# Patient Record
Sex: FEMALE | Race: AMERICAN INDIAN OR ALASKA NATIVE
[De-identification: names, ages, dates, MRNs, and addresses within clinical notes are randomized per-mention and may not be internally consistent; named-entity substitution may affect disease eponyms.]

---

## 2017-02-05 ENCOUNTER — HOSPITAL ENCOUNTER (EMERGENCY)
Dept: HOSPITAL 5 - ED | Age: 27
Discharge: LEFT BEFORE DISCHARGE | End: 2017-02-05
Payer: MEDICAID

## 2017-02-05 VITALS — SYSTOLIC BLOOD PRESSURE: 122 MMHG | DIASTOLIC BLOOD PRESSURE: 74 MMHG

## 2017-02-05 DIAGNOSIS — N93.9: Primary | ICD-10-CM

## 2017-02-05 DIAGNOSIS — Z53.21: ICD-10-CM

## 2017-02-05 LAB
BASOPHILS NFR BLD AUTO: 0.6 % (ref 0–1.8)
EOSINOPHIL NFR BLD AUTO: 2.7 % (ref 0–4.3)
HCT VFR BLD CALC: 36.8 % (ref 30.3–42.9)
HGB BLD-MCNC: 11.9 GM/DL (ref 10.1–14.3)
MCH RBC QN AUTO: 21 PG (ref 28–32)
MCHC RBC AUTO-ENTMCNC: 33 % (ref 30–34)
MCV RBC AUTO: 65 FL (ref 79–97)
PLATELET # BLD: 212 K/MM3 (ref 140–440)
RBC # BLD AUTO: 5.7 M/MM3 (ref 3.65–5.03)
WBC # BLD AUTO: 7.9 K/MM3 (ref 4.5–11)

## 2017-02-05 PROCEDURE — 84702 CHORIONIC GONADOTROPIN TEST: CPT

## 2017-02-05 PROCEDURE — 85025 COMPLETE CBC W/AUTO DIFF WBC: CPT

## 2017-02-05 PROCEDURE — 86901 BLOOD TYPING SEROLOGIC RH(D): CPT

## 2017-02-05 PROCEDURE — 86850 RBC ANTIBODY SCREEN: CPT

## 2017-02-05 PROCEDURE — 36415 COLL VENOUS BLD VENIPUNCTURE: CPT

## 2017-02-05 PROCEDURE — 86900 BLOOD TYPING SEROLOGIC ABO: CPT

## 2017-02-09 NOTE — ED ELOPEMENT REVIEW
ED Pt Elopement review





- Results review


Lab results: 





 Laboratory Tests











  02/05/17 02/05/17 02/05/17





  11:46 11:46 11:46


 


WBC  7.9  


 


RBC  5.70 H  


 


Hgb  11.9  


 


Hct  36.8  


 


MCV  65 L  


 


MCH  21 L  


 


MCHC  33  


 


RDW  14.5  


 


Plt Count  212  


 


Lymph % (Auto)  24.8  


 


Mono % (Auto)  5.0  


 


Eos % (Auto)  2.7  


 


Baso % (Auto)  0.6  


 


Lymph #  2.0  


 


Mono #  0.4  


 


Eos #  0.2  


 


Baso #  0.0  


 


Seg Neutrophils %  66.9  


 


Seg Neutrophils #  5.3  


 


HCG, Quant   83761 H 


 


Blood Type    O POSITIVE


 


Antibody Screen    Negative














- Call Back decision


Pt Call Back Decision: Call pt to return to ED ASAP (pregnant and vaginal 

bleeding and no prenatal care bring back to rule out ectopic)

## 2017-02-22 ENCOUNTER — HOSPITAL ENCOUNTER (EMERGENCY)
Dept: HOSPITAL 5 - ED | Age: 27
Discharge: LEFT BEFORE DISCHARGE | End: 2017-02-22
Payer: MEDICAID

## 2017-02-22 VITALS — SYSTOLIC BLOOD PRESSURE: 132 MMHG | DIASTOLIC BLOOD PRESSURE: 71 MMHG

## 2017-02-22 DIAGNOSIS — N93.9: Primary | ICD-10-CM

## 2017-02-22 DIAGNOSIS — R10.9: ICD-10-CM

## 2017-02-22 DIAGNOSIS — Z53.21: ICD-10-CM

## 2017-02-22 LAB
BASOPHILS NFR BLD AUTO: 0.3 % (ref 0–1.8)
BILIRUB UR QL STRIP: (no result)
BLOOD UR QL VISUAL: (no result)
EOSINOPHIL NFR BLD AUTO: 1.8 % (ref 0–4.3)
HCT VFR BLD CALC: 37.3 % (ref 30.3–42.9)
HGB BLD-MCNC: 12.4 GM/DL (ref 10.1–14.3)
KETONES UR STRIP-MCNC: 80 MG/DL
LEUKOCYTE ESTERASE UR QL STRIP: (no result)
MCH RBC QN AUTO: 21 PG (ref 28–32)
MCHC RBC AUTO-ENTMCNC: 33 % (ref 30–34)
MCV RBC AUTO: 64 FL (ref 79–97)
MUCOUS THREADS #/AREA URNS HPF: (no result) /HPF
NITRITE UR QL STRIP: (no result)
PH UR STRIP: 6 [PH] (ref 5–7)
PLATELET # BLD: 201 K/MM3 (ref 140–440)
RBC # BLD AUTO: 5.86 M/MM3 (ref 3.65–5.03)
RBC #/AREA URNS HPF: 16 /HPF (ref 0–6)
UROBILINOGEN UR-MCNC: < 2 MG/DL (ref ?–2)
WBC # BLD AUTO: 10.2 K/MM3 (ref 4.5–11)
WBC #/AREA URNS HPF: 166 /HPF (ref 0–6)

## 2017-02-22 PROCEDURE — 86900 BLOOD TYPING SEROLOGIC ABO: CPT

## 2017-02-22 PROCEDURE — 36415 COLL VENOUS BLD VENIPUNCTURE: CPT

## 2017-02-22 PROCEDURE — 86901 BLOOD TYPING SEROLOGIC RH(D): CPT

## 2017-02-22 PROCEDURE — 81025 URINE PREGNANCY TEST: CPT

## 2017-02-22 PROCEDURE — 81001 URINALYSIS AUTO W/SCOPE: CPT

## 2017-02-22 PROCEDURE — 85025 COMPLETE CBC W/AUTO DIFF WBC: CPT

## 2017-02-22 PROCEDURE — 86850 RBC ANTIBODY SCREEN: CPT

## 2017-02-22 NOTE — ED ELOPEMENT REVIEW
ED Pt Elopement review





- Results review


Lab results: 





 Laboratory Tests











  02/22/17 02/22/17 02/22/17





  00:50 00:56 Unknown


 


WBC   10.2 


 


RBC   5.86 H 


 


Hgb   12.4 


 


Hct   37.3 


 


MCV   64 L 


 


MCH   21 L 


 


MCHC   33 


 


RDW   14.6 


 


Plt Count   201 


 


Lymph % (Auto)   24.6 


 


Mono % (Auto)   5.6 


 


Eos % (Auto)   1.8 


 


Baso % (Auto)   0.3 


 


Lymph #   2.5 


 


Mono #   0.6 


 


Eos #   0.2 


 


Baso #   0.0 


 


Seg Neutrophils %   67.7 


 


Seg Neutrophils #   6.9 


 


Urine Color    Yellow


 


Urine Turbidity    Cloudy


 


Urine pH    6.0


 


Ur Specific Gravity    1.026


 


Urine Protein    30 mg/dl


 


Urine Glucose (UA)    Neg


 


Urine Ketones    80


 


Urine Blood    Mod


 


Urine Nitrite    Neg


 


Ur Reducing Substances    Not Reportable


 


Urine Bilirubin    Neg


 


Urine Ictotest    Not Reportable


 


Urine Urobilinogen    < 2.0


 


Ur Leukocyte Esterase    Lg


 


Urine WBC (Auto)    166.0 H


 


Urine RBC (Auto)    16.0


 


U Epithel Cells (Auto)    15.0 H


 


Hyaline Casts    3


 


Urine Mucus    2+


 


Urine HCG, Qual    Positive A


 


Blood Type  O POSITIVE  


 


Antibody Screen  Negative  














- Call Back decision


Pt Call Back Decision: No action required

## 2017-03-19 ENCOUNTER — HOSPITAL ENCOUNTER (EMERGENCY)
Dept: HOSPITAL 5 - ED | Age: 27
Discharge: LEFT BEFORE DISCHARGE | End: 2017-03-19
Payer: MEDICAID

## 2017-03-19 VITALS — SYSTOLIC BLOOD PRESSURE: 134 MMHG | DIASTOLIC BLOOD PRESSURE: 84 MMHG

## 2017-03-19 DIAGNOSIS — Z53.21: ICD-10-CM

## 2017-03-19 DIAGNOSIS — R10.9: Primary | ICD-10-CM

## 2017-03-19 LAB
ALBUMIN SERPL-MCNC: 3.9 G/DL (ref 3.9–5)
ALBUMIN/GLOB SERPL: 1 %
ALP SERPL-CCNC: 43 UNITS/L (ref 35–129)
ALT SERPL-CCNC: 12 UNITS/L (ref 7–56)
ANION GAP SERPL CALC-SCNC: 17 MMOL/L
BASOPHILS NFR BLD AUTO: 0.3 % (ref 0–1.8)
BILIRUB SERPL-MCNC: 0.4 MG/DL (ref 0.1–1.2)
BUN SERPL-MCNC: 7 MG/DL (ref 7–17)
BUN/CREAT SERPL: 14 %
CALCIUM SERPL-MCNC: 9 MG/DL (ref 8.4–10.2)
CHLORIDE SERPL-SCNC: 99.5 MMOL/L (ref 98–107)
CO2 SERPL-SCNC: 21 MMOL/L (ref 22–30)
EOSINOPHIL NFR BLD AUTO: 0.2 % (ref 0–4.3)
GLUCOSE SERPL-MCNC: 95 MG/DL (ref 65–100)
HCT VFR BLD CALC: 34.3 % (ref 30.3–42.9)
HGB BLD-MCNC: 11.2 GM/DL (ref 10.1–14.3)
LIPASE SERPL-CCNC: 26 UNITS/L (ref 13–60)
MCH RBC QN AUTO: 21 PG (ref 28–32)
MCHC RBC AUTO-ENTMCNC: 33 % (ref 30–34)
MCV RBC AUTO: 65 FL (ref 79–97)
PLATELET # BLD: 200 K/MM3 (ref 140–440)
POTASSIUM SERPL-SCNC: 3.7 MMOL/L (ref 3.6–5)
PROT SERPL-MCNC: 7.9 G/DL (ref 6.3–8.2)
RBC # BLD AUTO: 5.31 M/MM3 (ref 3.65–5.03)
SODIUM SERPL-SCNC: 134 MMOL/L (ref 137–145)
WBC # BLD AUTO: 14.6 K/MM3 (ref 4.5–11)

## 2017-03-19 PROCEDURE — 80053 COMPREHEN METABOLIC PANEL: CPT

## 2017-03-19 PROCEDURE — 36415 COLL VENOUS BLD VENIPUNCTURE: CPT

## 2017-03-19 PROCEDURE — 83690 ASSAY OF LIPASE: CPT

## 2017-03-19 PROCEDURE — 85025 COMPLETE CBC W/AUTO DIFF WBC: CPT

## 2021-02-13 ENCOUNTER — HOSPITAL ENCOUNTER (EMERGENCY)
Dept: HOSPITAL 5 - ED | Age: 31
Discharge: HOME | End: 2021-02-13
Payer: SELF-PAY

## 2021-02-13 VITALS — DIASTOLIC BLOOD PRESSURE: 87 MMHG | SYSTOLIC BLOOD PRESSURE: 150 MMHG

## 2021-02-13 DIAGNOSIS — N76.0: ICD-10-CM

## 2021-02-13 DIAGNOSIS — Z98.890: ICD-10-CM

## 2021-02-13 DIAGNOSIS — R10.2: ICD-10-CM

## 2021-02-13 DIAGNOSIS — N93.9: Primary | ICD-10-CM

## 2021-02-13 DIAGNOSIS — F17.200: ICD-10-CM

## 2021-02-13 DIAGNOSIS — Z79.899: ICD-10-CM

## 2021-02-13 DIAGNOSIS — Z86.018: ICD-10-CM

## 2021-02-13 LAB
BACTERIA #/AREA URNS HPF: (no result) /HPF
BILIRUB UR QL STRIP: (no result)
BLOOD UR QL VISUAL: (no result)
BUN SERPL-MCNC: 9 MG/DL (ref 7–17)
BUN/CREAT SERPL: 15 %
CALCIUM SERPL-MCNC: 8.4 MG/DL (ref 8.4–10.2)
HCT VFR BLD CALC: 37.1 % (ref 30.3–42.9)
HEMOLYSIS INDEX: 4
HGB BLD-MCNC: 12.2 GM/DL (ref 10.1–14.3)
MCHC RBC AUTO-ENTMCNC: 33 % (ref 30–34)
MCV RBC AUTO: 66 FL (ref 79–97)
MUCOUS THREADS #/AREA URNS HPF: (no result) /HPF
PH UR STRIP: 5 [PH] (ref 5–7)
PLATELET # BLD: 180 K/MM3 (ref 140–440)
PROT UR STRIP-MCNC: (no result) MG/DL
RBC # BLD AUTO: 5.63 M/MM3 (ref 3.65–5.03)
RBC #/AREA URNS HPF: 2 /HPF (ref 0–6)
UROBILINOGEN UR-MCNC: 2 MG/DL (ref ?–2)
WBC #/AREA URNS HPF: 1 /HPF (ref 0–6)

## 2021-02-13 PROCEDURE — 99283 EMERGENCY DEPT VISIT LOW MDM: CPT

## 2021-02-13 PROCEDURE — 81001 URINALYSIS AUTO W/SCOPE: CPT

## 2021-02-13 PROCEDURE — 84702 CHORIONIC GONADOTROPIN TEST: CPT

## 2021-02-13 PROCEDURE — 87591 N.GONORRHOEAE DNA AMP PROB: CPT

## 2021-02-13 PROCEDURE — 36415 COLL VENOUS BLD VENIPUNCTURE: CPT

## 2021-02-13 PROCEDURE — 80048 BASIC METABOLIC PNL TOTAL CA: CPT

## 2021-02-13 PROCEDURE — 87210 SMEAR WET MOUNT SALINE/INK: CPT

## 2021-02-13 PROCEDURE — 96372 THER/PROPH/DIAG INJ SC/IM: CPT

## 2021-02-13 PROCEDURE — 85027 COMPLETE CBC AUTOMATED: CPT

## 2021-02-13 NOTE — EVENT NOTE
ED Screening Note


Date of service: 02/13/21


Time: 10:45


ED Screening Note: 








This initial assessment/diagnostic orders/clinical plan/treatment(s) is/are 

subject to change based on patients health status, clinical progression and re-

assessment by fellow clinical providers in the ED. Further treatment and workup 

at subsequent clinical providers discretion. Patient/guardian urged not to elope

from the ED as their condition may be serious if not clinically assessed and 

managed. 





Initial orders include: 


This is a 30-year-old obese female presents to the emergency room complaining of

lower abdominal cramping and vaginal bleeding x1 week.  Her pain is a 7/10.  She

denies any nausea vomiting or urinary symptoms.  She states she is not pregnant 

and her last menstrual period was January 22.

## 2021-02-13 NOTE — EMERGENCY DEPARTMENT REPORT
ED Female  HPI





- General


Chief complaint: Back Pain/Injury


Stated complaint: BACK/SPOTTING PAIN


Time Seen by Provider: 02/13/21 11:10


Source: patient


Mode of arrival: Ambulatory


Limitations: No Limitations





- History of Present Illness


Initial comments: 





30-year-old female with with a past medical history of ovarian cyst and fibroids

presents to the hospital with complaints of intermittent 7/10 suprapubic 

abdominal cramping with pink vaginal discharge x1 week.  Pain radiates to the 

back and is worse with palpation.  She states her LMP was January 22 and this is

not appear to be menstrual bleeding.  She is sexually active with one partner 

and does not use condoms.  She denies dysuria, fever, nausea, vomiting, or 

previous abdominal surgeries.  Patient is not currently on birth control.  

Patient is taken Tylenol 500 mg intermittently for pain with mild improvement.





- Related Data


                                  Previous Rx's











 Medication  Instructions  Recorded  Last Taken  Type


 


HYDROcodone/APAP 5-325 [[place] 1 each PO Q6HR PRN #10 tablet 06/04/14 Unknown Rx





5/325 mg]    


 


Ibuprofen [Motrin 800 MG tab] 800 mg PO Q8H #30 tablet 06/04/14 Unknown Rx


 


Sulfamethoxazole/Trimethoprim 1 each PO BID #14 tablet 06/04/14 Unknown Rx





[Bactrim Ds]    


 


HYDROcodone/APAP 5-325 [[place] 1 each PO Q6HR PRN #20 tablet 09/30/15 Unknown Rx





5-325 mg TAB]    


 


Ibuprofen [Motrin] 800 mg PO Q8HR PRN #20 tablet 02/13/21 Unknown Rx


 


metroNIDAZOLE [Flagyl] 500 mg PO Q12HR #14 tab 02/13/21 Unknown Rx











                                    Allergies











Allergy/AdvReac Type Severity Reaction Status Date / Time


 


No Known Allergies Allergy   Verified 06/04/14 11:18














ED Review of Systems


ROS: 


Stated complaint: BACK/SPOTTING PAIN


Other details as noted in HPI





Comment: All other systems reviewed and negative





ED Past Medical Hx





- Past Medical History


Previous Medical History?: Yes


Additional medical history: gastroenteritis 2-2014, Ovarian Cyst, fibroids





- Surgical History


Past Surgical History?: No





- Social History


Smoking Status: Current Every Day Smoker


Substance Use Type: Alcohol





- Medications


Home Medications: 


                                Home Medications











 Medication  Instructions  Recorded  Confirmed  Last Taken  Type


 


HYDROcodone/APAP 5-325 [[place] 1 each PO Q6HR PRN #10 tablet 06/04/14  Unknown Rx





5/325 mg]     


 


Ibuprofen [Motrin 800 MG tab] 800 mg PO Q8H #30 tablet 06/04/14  Unknown Rx


 


Sulfamethoxazole/Trimethoprim 1 each PO BID #14 tablet 06/04/14  Unknown Rx





[Bactrim Ds]     


 


HYDROcodone/APAP 5-325 [[place] 1 each PO Q6HR PRN #20 tablet 09/30/15  Unknown Rx





5-325 mg TAB]     


 


Ibuprofen [Motrin] 800 mg PO Q8HR PRN #20 tablet 02/13/21  Unknown Rx


 


metroNIDAZOLE [Flagyl] 500 mg PO Q12HR #14 tab 02/13/21  Unknown Rx














ED Physical Exam





- General


Limitations: No Limitations





- Other


Other exam information: 





General: No acute distress


Head: Atraumatic


Eyes: normal appearance


ENT: Moist mucous membranes


Neck: Normal appearance, no midline tenderness


Chest: Clear to auscultation bilaterally


CV: Regular rate and rhythm


Abdomen: Soft, normal bowel sounds, suprapubic tenderness nondistended, no 

rebound or guarding


: Minimal Brown/old blood in vaginal vault at cervix.  cervix os with minimal 

white discharge.  No CMT tenderness.  Bilateral adnexal tenderness.  Samples 

collected


Back: Normal inspection


Extremity: Normal inspection, full range of motion


Neuro: Alert O x 3, no facial asymmetry, speech clear, no gross motor sensory 

deficit


Psych: Appropriate behavior


Skin: No rash





ED Course


                                   Vital Signs











  02/13/21 02/13/21





  10:38 11:42


 


Temperature 98.8 F 


 


Pulse Rate 90 84


 


Respiratory 18 16





Rate  


 


Blood Pressure 151/102 


 


Blood Pressure  144/68





[Right]  


 


O2 Sat by Pulse 99 98





Oximetry  














ED Medical Decision Making





- Lab Data


Result diagrams: 


                                 02/13/21 11:18





                                 02/13/21 11:18








                                   Lab Results











  02/13/21 02/13/21 02/13/21 Range/Units





  11:09 11:18 11:18 


 


WBC    7.3  (4.5-11.0)  K/mm3


 


RBC    5.63 H  (3.65-5.03)  M/mm3


 


Hgb    12.2  (10.1-14.3)  gm/dl


 


Hct    37.1  (30.3-42.9)  %


 


MCV    66 L  (79-97)  fl


 


MCH    22 L  (28-32)  pg


 


MCHC    33  (30-34)  %


 


RDW    15.5 H  (13.2-15.2)  %


 


Plt Count    180  (140-440)  K/mm3


 


Sodium     (137-145)  mmol/L


 


Potassium     (3.6-5.0)  mmol/L


 


Chloride     ()  mmol/L


 


Carbon Dioxide     (22-30)  mmol/L


 


Anion Gap     mmol/L


 


BUN     (7-17)  mg/dL


 


Creatinine     (0.6-1.2)  mg/dL


 


Estimated GFR     ml/min


 


BUN/Creatinine Ratio     %


 


Glucose     ()  mg/dL


 


Calcium     (8.4-10.2)  mg/dL


 


HCG, Quant   < 2   (0-4)  mIU/mL


 


Urine Color  Yellow    (Yellow)  


 


Urine Turbidity  Clear    (Clear)  


 


Urine pH  5.0    (5.0-7.0)  


 


Ur Specific Gravity  1.021    (1.003-1.030)  


 


Urine Protein  <15 mg/dl    (Negative)  mg/dL


 


Urine Glucose (UA)  Neg    (Negative)  mg/dL


 


Urine Ketones  Neg    (Negative)  mg/dL


 


Urine Blood  Neg    (Negative)  


 


Urine Nitrite  Neg    (Negative)  


 


Urine Bilirubin  Neg    (Negative)  


 


Urine Urobilinogen  2.0    (<2.0)  mg/dL


 


Ur Leukocyte Esterase  Neg    (Negative)  


 


Urine WBC (Auto)  1.0    (0.0-6.0)  /HPF


 


Urine RBC (Auto)  2.0    (0.0-6.0)  /HPF


 


U Epithel Cells (Auto)  3.0    (0-13.0)  /HPF


 


Urine Bacteria (Auto)  1+    (Negative)  /HPF


 


Urine Mucus  Few    /HPF














  02/13/21 Range/Units





  11:18 


 


WBC   (4.5-11.0)  K/mm3


 


RBC   (3.65-5.03)  M/mm3


 


Hgb   (10.1-14.3)  gm/dl


 


Hct   (30.3-42.9)  %


 


MCV   (79-97)  fl


 


MCH   (28-32)  pg


 


MCHC   (30-34)  %


 


RDW   (13.2-15.2)  %


 


Plt Count   (140-440)  K/mm3


 


Sodium  137  (137-145)  mmol/L


 


Potassium  3.8  (3.6-5.0)  mmol/L


 


Chloride  105.7  ()  mmol/L


 


Carbon Dioxide  25  (22-30)  mmol/L


 


Anion Gap  10  mmol/L


 


BUN  9  (7-17)  mg/dL


 


Creatinine  0.6  (0.6-1.2)  mg/dL


 


Estimated GFR  > 60  ml/min


 


BUN/Creatinine Ratio  15  %


 


Glucose  79  ()  mg/dL


 


Calcium  8.4  (8.4-10.2)  mg/dL


 


HCG, Quant   (0-4)  mIU/mL


 


Urine Color   (Yellow)  


 


Urine Turbidity   (Clear)  


 


Urine pH   (5.0-7.0)  


 


Ur Specific Gravity   (1.003-1.030)  


 


Urine Protein   (Negative)  mg/dL


 


Urine Glucose (UA)   (Negative)  mg/dL


 


Urine Ketones   (Negative)  mg/dL


 


Urine Blood   (Negative)  


 


Urine Nitrite   (Negative)  


 


Urine Bilirubin   (Negative)  


 


Urine Urobilinogen   (<2.0)  mg/dL


 


Ur Leukocyte Esterase   (Negative)  


 


Urine WBC (Auto)   (0.0-6.0)  /HPF


 


Urine RBC (Auto)   (0.0-6.0)  /HPF


 


U Epithel Cells (Auto)   (0-13.0)  /HPF


 


Urine Bacteria (Auto)   (Negative)  /HPF


 


Urine Mucus   /HPF








Wet prep positive for clue cells negative for trichomonas and yeast.  Gonorrhea 

chlamydia pending





- Medical Decision Making





30-year-old with pelvic cramping and vaginal spotting history of fibroids and 

ovarian cyst.  She is not pregnant, no significant menorrhagia with normal vital

 signs and H&H.  hCG negative.  UA negative.  Wet prep positive for bacterial 

vaginosis and Flagyl prescribed.  Patient empirically treated for gonorrhea 

chlamydia with Rocephin azithromycin pending results.  Outpatient follow-up 

advised with GYN for further investigation of uterine fibroids/possible ovarian 

cyst/vaginal spotting


Critical Care Time: No


Critical care attestation.: 


If time is entered above; I have spent that time in minutes in the direct care 

of this critically ill patient, excluding procedure time.








ED Disposition


Clinical Impression: 


 Vaginal spotting, Pelvic cramping, Bacterial vaginosis, History of uterine 

fibroid





Disposition: DC-01 TO HOME OR SELFCARE


Is pt being admited?: No


Does the pt Need Aspirin: No


Condition: Stable


Instructions:  Bacterial Vaginosis (ED), Abnormal Uterine Bleeding, Bacterial 

Vaginosis


Additional Instructions: 


Take the medication as prescribed.  Follow-up with your doctor or doctor/clinic 

provided.  Return if symptoms worsen as indicated by your discharge 

instructions.





 Your gonorrhea and Chlamydia tests have been sent and take 2-3 days to result. 

 You may obtain your results by going to medical records with your photo ID or 

your follow-up physician may request the results be sent to his or her office 

with your written permission.  You did receive treatment for gonorrhea and 

chlamydia while you were in the ER if results positive your partner will need 

treatment as well


Prescriptions: 


metroNIDAZOLE [Flagyl] 500 mg PO Q12HR #14 tab


Ibuprofen [Motrin] 800 mg PO Q8HR PRN #20 tablet


 PRN Reason: Pain , Severe (7-10)


Referrals: 


[facility] [Provider Group] - 3-5 Days ((gyn clinic))


ESTELA FLROES MD [Staff Physician] - 3-5 Days


(Gyn doctor


)


Forms:  [facility]

## 2021-07-28 ENCOUNTER — HOSPITAL ENCOUNTER (EMERGENCY)
Dept: HOSPITAL 5 - ED | Age: 31
Discharge: HOME | End: 2021-07-28
Payer: SELF-PAY

## 2021-07-28 VITALS — DIASTOLIC BLOOD PRESSURE: 106 MMHG | SYSTOLIC BLOOD PRESSURE: 163 MMHG

## 2021-07-28 DIAGNOSIS — F17.200: ICD-10-CM

## 2021-07-28 DIAGNOSIS — K02.9: Primary | ICD-10-CM

## 2021-07-28 DIAGNOSIS — R03.0: ICD-10-CM

## 2021-07-28 DIAGNOSIS — Z79.899: ICD-10-CM

## 2021-07-28 DIAGNOSIS — K08.89: ICD-10-CM

## 2021-07-28 PROCEDURE — 99281 EMR DPT VST MAYX REQ PHY/QHP: CPT

## 2021-07-28 NOTE — EMERGENCY DEPARTMENT REPORT
ED ENT HPI





- General


Chief complaint: Dental/Oral


Stated complaint: TOOTHACHE/HEADACHE/EYE PAIN


Time Seen by Provider: 07/28/21 11:12


Source: patient


Mode of arrival: Ambulatory


Limitations: No Limitations





- History of Present Illness


Initial comments: 





Patient is a 30-year-old female presents emergency room with complaints of left 

lower dental pain that began a week ago.  She states now that is causing the 

left side of her face to hurt.  She states that she cannot get an appointment 

with her dentist until August 31.  She states that she has not seen a dentist in

approximately 10 years.  She denies any fever, nausea, vomiting, diarrhea, 

difficulty breathing, difficulty swallowing.  No past medical history.  No 

allergies to medications.





- Related Data


                                  Previous Rx's











 Medication  Instructions  Recorded  Last Taken  Type


 


HYDROcodone/APAP 5-325 [[place] 1 each PO Q6HR PRN #10 tablet 06/04/14 Unknown Rx





5/325 mg]    


 


Ibuprofen [Motrin 800 MG tab] 800 mg PO Q8H #30 tablet 06/04/14 Unknown Rx


 


Sulfamethoxazole/Trimethoprim 1 each PO BID #14 tablet 06/04/14 Unknown Rx





[Bactrim Ds]    


 


HYDROcodone/APAP 5-325 [[place] 1 each PO Q6HR PRN #20 tablet 09/30/15 Unknown Rx





5-325 mg TAB]    


 


Ibuprofen [Motrin] 800 mg PO Q8HR PRN #20 tablet 02/13/21 Unknown Rx


 


metroNIDAZOLE [Flagyl] 500 mg PO Q12HR #14 tab 02/13/21 Unknown Rx


 


Chlorhexidine Mouthwash [Peridex] 15 ml MM BID #1 bottle 07/28/21 Unknown Rx


 


Naproxen [EC-Naprosyn] 500 mg PO BID PRN #20 tablet. 07/28/21 Unknown Rx


 


Penicillin Vk [Veetids TAB] 500 mg PO QID 7 Days #56 tablet 07/28/21 Unknown Rx











                                    Allergies











Allergy/AdvReac Type Severity Reaction Status Date / Time


 


No Known Allergies Allergy   Verified 07/28/21 10:13














ED Dental HPI





- General


Chief complaint: Dental/Oral


Stated complaint: TOOTHACHE/HEADACHE/EYE PAIN


Time Seen by Provider: 07/28/21 11:12


Source: patient


Mode of arrival: Ambulatory


Limitations: No Limitations





- Related Data


                                  Previous Rx's











 Medication  Instructions  Recorded  Last Taken  Type


 


HYDROcodone/APAP 5-325 [[place] 1 each PO Q6HR PRN #10 tablet 06/04/14 Unknown Rx





5/325 mg]    


 


Ibuprofen [Motrin 800 MG tab] 800 mg PO Q8H #30 tablet 06/04/14 Unknown Rx


 


Sulfamethoxazole/Trimethoprim 1 each PO BID #14 tablet 06/04/14 Unknown Rx





[Bactrim Ds]    


 


HYDROcodone/APAP 5-325 [[place] 1 each PO Q6HR PRN #20 tablet 09/30/15 Unknown Rx





5-325 mg TAB]    


 


Ibuprofen [Motrin] 800 mg PO Q8HR PRN #20 tablet 02/13/21 Unknown Rx


 


metroNIDAZOLE [Flagyl] 500 mg PO Q12HR #14 tab 02/13/21 Unknown Rx


 


Chlorhexidine Mouthwash [Peridex] 15 ml MM BID #1 bottle 07/28/21 Unknown Rx


 


Naproxen [EC-Naprosyn] 500 mg PO BID PRN #20 tablet. 07/28/21 Unknown Rx


 


Penicillin Vk [Veetids TAB] 500 mg PO QID 7 Days #56 tablet 07/28/21 Unknown Rx











                                    Allergies











Allergy/AdvReac Type Severity Reaction Status Date / Time


 


No Known Allergies Allergy   Verified 07/28/21 10:13














ED Review of Systems


ROS: 


Stated complaint: TOOTHACHE/HEADACHE/EYE PAIN


Other details as noted in HPI





Comment: All other systems reviewed and negative





ED Past Medical Hx





- Past Medical History


Additional medical history: gastroenteritis 2-2014, Ovarian Cyst, fibroids





- Surgical History


Past Surgical History?: No





- Social History


Smoking Status: Current Every Day Smoker


Substance Use Type: Alcohol





- Medications


Home Medications: 


                                Home Medications











 Medication  Instructions  Recorded  Confirmed  Last Taken  Type


 


HYDROcodone/APAP 5-325 [[place] 1 each PO Q6HR PRN #10 tablet 06/04/14  Unknown Rx





5/325 mg]     


 


Ibuprofen [Motrin 800 MG tab] 800 mg PO Q8H #30 tablet 06/04/14  Unknown Rx


 


Sulfamethoxazole/Trimethoprim 1 each PO BID #14 tablet 06/04/14  Unknown Rx





[Bactrim Ds]     


 


HYDROcodone/APAP 5-325 [[place] 1 each PO Q6HR PRN #20 tablet 09/30/15  Unknown Rx





5-325 mg TAB]     


 


Ibuprofen [Motrin] 800 mg PO Q8HR PRN #20 tablet 02/13/21  Unknown Rx


 


metroNIDAZOLE [Flagyl] 500 mg PO Q12HR #14 tab 02/13/21  Unknown Rx


 


Chlorhexidine Mouthwash [Peridex] 15 ml MM BID #1 bottle 07/28/21  Unknown Rx


 


Naproxen [EC-Naprosyn] 500 mg PO BID PRN #20 tablet. 07/28/21  Unknown Rx


 


Penicillin Vk [Veetids TAB] 500 mg PO QID 7 Days #56 tablet 07/28/21  Unknown Rx














ED Physical Exam





- General


Limitations: No Limitations


General appearance: alert, in no apparent distress





- Head


Head exam: Present: atraumatic, normocephalic





- Eye


Eye exam: Present: normal appearance





- ENT


ENT exam: Present: normal orophraynx (no muffled voice, no submandibular edema 

), mucous membranes moist, other (dental carries present to the right lower 

molar and right lower wisdom tooth, no obvious visualized dental carries present

to the left lower molar, ttp of the left lower molar, no obvious edema, uvula is

mdline, no uvular edema or deviation, no trismus, no tongue elevation)





- Respiratory


Respiratory exam: Absent: respiratory distress, accessory muscle use





- Neurological Exam


Neurological exam: Present: alert, oriented X3





- Psychiatric


Psychiatric exam: Present: normal affect, normal mood





- Skin


Skin exam: Present: warm, dry, intact





ED Course


                                   Vital Signs











  07/28/21





  10:17


 


Temperature 98.9 F


 


Pulse Rate 68


 


Respiratory 24





Rate 


 


Blood Pressure 163/106


 


O2 Sat by Pulse 95





Oximetry 














ED Medical Decision Making





- Medical Decision Making





Patient is a 30-year-old female presents emergency room with complaints of left 

lower dental pain that began a week ago.  She states now that is causing the 

left side of her face to hurt.  She states that she cannot get an appointment 

with her dentist until August 31.  She states that she has not seen a dentist in

approximately 10 years.  She denies any fever, nausea, vomiting, diarrhea, 

difficulty breathing, difficulty swallowing.  No past medical history.  No 

allergies to medications. vitals with elevated blood pressure, pt is 

asymptomatic, will have pt follow-up with PCP, discussed lifestyle modifications

and keeping a blood pressure log, the up-to-date medical literature does not 

recommend emergently lowering asymptomatic elevated blood pressure.  On 

exam:dental carries present to the right lower molar and right lower wisdom 

tooth, no obvious visualized dental carries present to the left lower molar, ttp

of the left lower molar, no obvious edema, uvula is mdline, no uvular edema or 

deviation, no trismus, no tongue elevation, no muffled voice, no submandibular 

edema.  No obvious signs of significant dental abscess, facial abscess, facial 

cellulitis, or José Manuel's at this time. given that the patient is not able to see 

a dentist until August 31, will start patient on antibiotics and discussed the 

importance of dental follow-up. advised pt Please take medication as prescribed.

 May gargle with warm salt water.  Follow-up with a dentist.  Return to 

emergency room for any new or worse symptoms.








Follow-up with your primary care doctor regarding the elevation in your blood 

pressure during today's visit, eat a low-sodium diet, incorporate 30-60 minutes 

of daily exercise, keep a blood pressure log and take this to the primary care 

doctor


Critical care attestation.: 


If time is entered above; I have spent that time in minutes in the direct care 

of this critically ill patient, excluding procedure time.








ED Disposition


Clinical Impression: 


 Dental caries, Dentalgia, Elevated blood pressure reading





Disposition: DC-01 TO HOME OR SELFCARE


Is pt being admited?: No


Does the pt Need Aspirin: No


Condition: Stable


Additional Instructions: 


Please take medication as prescribed.  May gargle with warm salt water.  Follow-

up with a dentist.  Return to emergency room for any new or worse symptoms.








Follow-up with your primary care doctor regarding the elevation in your blood 

pressure during today's visit, eat a low-sodium diet, incorporate 30-60 minutes 

of daily exercise, keep a blood pressure log and take this to the primary care 

doctor


Prescriptions: 


Naproxen [EC-Naprosyn] 500 mg PO BID PRN #20 tablet.dr


 PRN Reason: pain


Chlorhexidine Mouthwash [Peridex] 15 ml MM BID #1 bottle


Penicillin Vk [Veetids TAB] 500 mg PO QID 7 Days #56 tablet


Referrals: 


DEREK AMARO MD [Staff Physician] - 3-5 Days


[facility] [Provider Group] - 3-5 Days


Time of Disposition: 11:25


Print Language: ENGLISH

## 2022-05-20 ENCOUNTER — HOSPITAL ENCOUNTER (EMERGENCY)
Dept: HOSPITAL 5 - ED | Age: 32
Discharge: HOME | End: 2022-05-20
Payer: SELF-PAY

## 2022-05-20 VITALS — SYSTOLIC BLOOD PRESSURE: 124 MMHG | DIASTOLIC BLOOD PRESSURE: 89 MMHG

## 2022-05-20 DIAGNOSIS — J02.0: Primary | ICD-10-CM

## 2022-05-20 DIAGNOSIS — F17.200: ICD-10-CM

## 2022-05-20 DIAGNOSIS — F10.20: ICD-10-CM

## 2022-05-20 PROCEDURE — 99282 EMERGENCY DEPT VISIT SF MDM: CPT

## 2022-05-20 NOTE — EMERGENCY DEPARTMENT REPORT
ED ENT HPI





- General


Chief complaint: Sore Throat


Stated complaint: SORE THROAT


Source: patient


Mode of arrival: Ambulatory


Limitations: No Limitations





- History of Present Illness


Initial comments: 


31-year-old female presents to the ED complaining of sore throat x1 day.  

Patient states that pain is a currently 8 out of 10.  Patient states that she 

was exposed to someone with strep throat.  Patient states over-the-counter 

medication without any relief.  Patient denies any nausea , vomiting or 

difficult swallowing.  Patient is alert and oriented x3.  No acute distress 

noted.  No ill appearance noted.





-: Gradual


Severity scale (0 -10): 8


Quality: aching


Consistency: intermittent


Improves with: none


Worsens with: none


Associated Symptoms: cough, sore throat





- Related Data


                                  Previous Rx's











 Medication  Instructions  Recorded  Last Taken  Type


 


HYDROcodone/APAP 5-325 [[place] 1 each PO Q6HR PRN #10 tablet 06/04/14 Unknown Rx





5/325 mg]    


 


Ibuprofen [Motrin 800 MG tab] 800 mg PO Q8H #30 tablet 06/04/14 Unknown Rx


 


Sulfamethoxazole/Trimethoprim 1 each PO BID #14 tablet 06/04/14 Unknown Rx





[Bactrim Ds]    


 


HYDROcodone/APAP 5-325 [[place] 1 each PO Q6HR PRN #20 tablet 09/30/15 Unknown Rx





5-325 mg TAB]    


 


Ibuprofen [Motrin] 800 mg PO Q8HR PRN #20 tablet 02/13/21 Unknown Rx


 


metroNIDAZOLE [Flagyl] 500 mg PO Q12HR #14 tab 02/13/21 Unknown Rx


 


Chlorhexidine Mouthwash [Peridex] 15 ml MM BID #1 bottle 07/28/21 Unknown Rx


 


Naproxen [EC-Naprosyn] 500 mg PO BID PRN #20 tablet. 07/28/21 Unknown Rx


 


Penicillin Vk [Veetids TAB] 500 mg PO QID 7 Days #56 tablet 07/28/21 Unknown Rx


 


Ibuprofen [Motrin] 800 mg PO Q8HR PRN 15 Days #30 05/20/22 Unknown Rx





 tablet   


 


Penicillin V Potassium 500 mg PO BID 10 Days #20 tab 05/20/22 Unknown Rx


 


predniSONE [Deltasone] 50 mg PO QDAY 3 Days #3 tab 05/20/22 Unknown Rx











                                    Allergies











Allergy/AdvReac Type Severity Reaction Status Date / Time


 


No Known Allergies Allergy   Verified 07/28/21 10:13














ED Dental HPI





- General


Chief complaint: Sore Throat


Stated complaint: SORE THROAT


Source: patient


Mode of arrival: Ambulatory


Limitations: No Limitations





- Related Data


                                  Previous Rx's











 Medication  Instructions  Recorded  Last Taken  Type


 


HYDROcodone/APAP 5-325 [[place] 1 each PO Q6HR PRN #10 tablet 06/04/14 Unknown Rx





5/325 mg]    


 


Ibuprofen [Motrin 800 MG tab] 800 mg PO Q8H #30 tablet 06/04/14 Unknown Rx


 


Sulfamethoxazole/Trimethoprim 1 each PO BID #14 tablet 06/04/14 Unknown Rx





[Bactrim Ds]    


 


HYDROcodone/APAP 5-325 [[place] 1 each PO Q6HR PRN #20 tablet 09/30/15 Unknown Rx





5-325 mg TAB]    


 


Ibuprofen [Motrin] 800 mg PO Q8HR PRN #20 tablet 02/13/21 Unknown Rx


 


metroNIDAZOLE [Flagyl] 500 mg PO Q12HR #14 tab 02/13/21 Unknown Rx


 


Chlorhexidine Mouthwash [Peridex] 15 ml MM BID #1 bottle 07/28/21 Unknown Rx


 


Naproxen [EC-Naprosyn] 500 mg PO BID PRN #20 tablet.dr 07/28/21 Unknown Rx


 


Penicillin Vk [Veetids TAB] 500 mg PO QID 7 Days #56 tablet 07/28/21 Unknown Rx


 


Ibuprofen [Motrin] 800 mg PO Q8HR PRN 15 Days #30 05/20/22 Unknown Rx





 tablet   


 


Penicillin V Potassium 500 mg PO BID 10 Days #20 tab 05/20/22 Unknown Rx


 


predniSONE [Deltasone] 50 mg PO QDAY 3 Days #3 tab 05/20/22 Unknown Rx











                                    Allergies











Allergy/AdvReac Type Severity Reaction Status Date / Time


 


No Known Allergies Allergy   Verified 07/28/21 10:13














ED Review of Systems


ROS: 


Stated complaint: SORE THROAT


Other details as noted in HPI





Constitutional: denies: chills, fever


Eyes: denies: eye pain, eye discharge, vision change


ENT: throat pain.  denies: ear pain


Respiratory: denies: cough, shortness of breath, wheezing


Cardiovascular: denies: chest pain, palpitations


Endocrine: no symptoms reported


Gastrointestinal: denies: abdominal pain, nausea, diarrhea


Genitourinary: denies: urgency, dysuria, discharge


Musculoskeletal: denies: back pain, joint swelling, arthralgia


Skin: denies: rash, lesions


Neurological: denies: headache, weakness, paresthesias


Psychiatric: denies: anxiety, depression


Hematological/Lymphatic: denies: easy bleeding, easy bruising





ED Past Medical Hx





- Past Medical History


Additional medical history: gastroenteritis 2-2014, Ovarian Cyst, fibroids





- Social History


Smoking Status: Current Every Day Smoker


Substance Use Type: Alcohol





- Medications


Home Medications: 


                                Home Medications











 Medication  Instructions  Recorded  Confirmed  Last Taken  Type


 


HYDROcodone/APAP 5-325 [[place] 1 each PO Q6HR PRN #10 tablet 06/04/14  Unknown Rx





5/325 mg]     


 


Ibuprofen [Motrin 800 MG tab] 800 mg PO Q8H #30 tablet 06/04/14  Unknown Rx


 


Sulfamethoxazole/Trimethoprim 1 each PO BID #14 tablet 06/04/14  Unknown Rx





[Bactrim Ds]     


 


HYDROcodone/APAP 5-325 [[place] 1 each PO Q6HR PRN #20 tablet 09/30/15  Unknown Rx





5-325 mg TAB]     


 


Ibuprofen [Motrin] 800 mg PO Q8HR PRN #20 tablet 02/13/21  Unknown Rx


 


metroNIDAZOLE [Flagyl] 500 mg PO Q12HR #14 tab 02/13/21  Unknown Rx


 


Chlorhexidine Mouthwash [Peridex] 15 ml MM BID #1 bottle 07/28/21  Unknown Rx


 


Naproxen [EC-Naprosyn] 500 mg PO BID PRN #20 tablet. 07/28/21  Unknown Rx


 


Penicillin Vk [Veetids TAB] 500 mg PO QID 7 Days #56 tablet 07/28/21  Unknown Rx


 


Ibuprofen [Motrin] 800 mg PO Q8HR PRN 15 Days #30 05/20/22  Unknown Rx





 tablet    


 


Penicillin V Potassium 500 mg PO BID 10 Days #20 tab 05/20/22  Unknown Rx


 


predniSONE [Deltasone] 50 mg PO QDAY 3 Days #3 tab 05/20/22  Unknown Rx














ED Physical Exam





- General


Limitations: No Limitations


General appearance: alert, in no apparent distress





- Head


Head exam: Present: atraumatic, normocephalic





- Eye


Eye exam: Present: normal appearance





- ENT


ENT exam: Present: mucous membranes moist





- Neck


Neck exam: Present: normal inspection





- Respiratory


Respiratory exam: Present: normal lung sounds bilaterally.  Absent: respiratory 

distress





- Cardiovascular


Cardiovascular Exam: Present: regular rate, normal rhythm.  Absent: systolic 

murmur, diastolic murmur, rubs, gallop





- GI/Abdominal


GI/Abdominal exam: Present: soft, normal bowel sounds





- Extremities Exam


Extremities exam: Present: normal inspection





- Back Exam


Back exam: Present: normal inspection





- Neurological Exam


Neurological exam: Present: alert, oriented X3





- Psychiatric


Psychiatric exam: Present: normal affect, normal mood





- Skin


Skin exam: Present: warm, dry, intact, normal color.  Absent: rash





ED Course





                                   Vital Signs











  05/20/22





  04:59


 


Temperature 97.5 F L


 


Pulse Rate 73


 


Respiratory 16





Rate 


 


Blood Pressure 149/89


 


O2 Sat by Pulse 93





Oximetry 














ED Medical Decision Making





- Medical Decision Making


31-year-old female presents to the ED complaining of sore throat x1 day.  

Patient states that pain is a currently 8 out of 10.  Patient states that she 

was exposed to someone with strep throat.  Patient states over-the-counter 

medication without any relief.  Patient denies any cough ,nausea , vomiting or 

difficult swallowing.  Patient is alert and oriented x3.  No acute distress 

noted.  No ill appearance noted.  Physical examination patient has tonsillar 

exudate, tonsillomegaly , tonsillar erythema.





Rechecked the patient is resting quietly quietly and comfortable and feeling 

better. I discussed the results of diagnostic study, my clinical impression and 

the plan for further treatment with the patient. Patient agrees with plan and 

discharge at this present time. All question addressed.





I have given the patient instruction regarding a diagnosis ,expectation ,follow-

up and return precaution. I explained to the patient that emergent condition may

 arise and to return to the ED for new worsen and any new persisting condition. 

I have explained the importance of following up with the primary care physician 

or referral physician listed below has instructed. The patient verbalized 

understanding of discharge instruction.








Critical care attestation.: 


If time is entered above; I have spent that time in minutes in the direct care 

of this critically ill patient, excluding procedure time.








ED Disposition


Clinical Impression: 


 Strep throat





Disposition: 01 HOME / SELF CARE / HOMELESS


Is pt being admited?: No


Does the pt Need Aspirin: No


Condition: Stable


Instructions:  Strep Throat, Adult, Easy-to-Read


Additional Instructions: 


Take medication as prescribed


Return to the ED for any worsening symptom


Prescriptions: 


predniSONE [Deltasone] 50 mg PO QDAY 3 Days #3 tab


Ibuprofen [Motrin] 800 mg PO Q8HR PRN 15 Days #30 tablet


 PRN Reason: Pain, Mild (1-3)


Penicillin V Potassium 500 mg PO BID 10 Days #20 tab


Referrals: 


PRIMARY CARE,MD [Primary Care Provider] - 3-5 Days


[facility] [Provider Group] - 3-5 Days


Forms:  Work/School Release Form(ED)


Time of Disposition: 11:31

## 2022-09-29 ENCOUNTER — HOSPITAL ENCOUNTER (EMERGENCY)
Dept: HOSPITAL 5 - ED | Age: 32
Discharge: HOME | End: 2022-09-29
Payer: SELF-PAY

## 2022-09-29 VITALS — DIASTOLIC BLOOD PRESSURE: 94 MMHG | SYSTOLIC BLOOD PRESSURE: 159 MMHG

## 2022-09-29 DIAGNOSIS — I10: ICD-10-CM

## 2022-09-29 DIAGNOSIS — F17.200: ICD-10-CM

## 2022-09-29 DIAGNOSIS — N93.8: Primary | ICD-10-CM

## 2022-09-29 DIAGNOSIS — F10.20: ICD-10-CM

## 2022-09-29 DIAGNOSIS — D25.9: ICD-10-CM

## 2022-09-29 LAB
BILIRUB UR QL STRIP: (no result)
BLOOD UR QL VISUAL: (no result)
BUN SERPL-MCNC: 12 MG/DL (ref 7–17)
BUN/CREAT SERPL: 17 %
CALCIUM SERPL-MCNC: 8.9 MG/DL (ref 8.4–10.2)
HCT VFR BLD CALC: 33.9 % (ref 30.3–42.9)
HEMOLYSIS INDEX: 17
HGB BLD-MCNC: 11.7 GM/DL (ref 10.1–14.3)
MCHC RBC AUTO-ENTMCNC: 34 % (ref 30–34)
MCV RBC AUTO: 63 FL (ref 79–97)
MUCOUS THREADS #/AREA URNS HPF: (no result) /HPF
PH UR STRIP: 7 [PH] (ref 5–7)
PLATELET # BLD: 192 K/MM3 (ref 140–440)
PROT UR STRIP-MCNC: (no result) MG/DL
RBC # BLD AUTO: 5.36 M/MM3 (ref 3.65–5.03)
RBC #/AREA URNS HPF: 1 /HPF (ref 0–6)
UROBILINOGEN UR-MCNC: < 2 MG/DL (ref ?–2)
WBC #/AREA URNS HPF: 1 /HPF (ref 0–6)

## 2022-09-29 PROCEDURE — 80048 BASIC METABOLIC PNL TOTAL CA: CPT

## 2022-09-29 PROCEDURE — 85027 COMPLETE CBC AUTOMATED: CPT

## 2022-09-29 PROCEDURE — 99283 EMERGENCY DEPT VISIT LOW MDM: CPT

## 2022-09-29 PROCEDURE — 84702 CHORIONIC GONADOTROPIN TEST: CPT

## 2022-09-29 PROCEDURE — 81001 URINALYSIS AUTO W/SCOPE: CPT

## 2022-09-29 PROCEDURE — 36415 COLL VENOUS BLD VENIPUNCTURE: CPT

## 2022-09-29 NOTE — EMERGENCY DEPARTMENT REPORT
ED Female  HPI





- General


Chief complaint: Vaginal Bleeding


Stated complaint: STOMACH PAIN/ BACK PAIN


Time Seen by Provider: 09/29/22 08:56


Source: patient


Mode of arrival: Ambulatory


Limitations: No Limitations





- History of Present Illness


Initial comments: 





Patient is a 31-year-old female that comes to the emergency room complaining of 

vaginal bleeding for 2 weeks.  She states that she had her period on 910, stop 

bleeding and then started bleeding again.  She does have a history of fibroids 

diagnosed years ago.  She has 1 child.  She has no tachycardia, hypotension.  

She denies vaginal discharge.  She denies back pain.  She has no nausea vomiting

or diarrhea.


MD Complaint: vaginal bleeding


-: Sudden, week(s)


Radiation: non-radiating


Quality: cramping


Consistency: constant


Improves with: none


Worsens with: none


Are you Pregnant Now?: No


Associated Symptoms: denies other symptoms, vaginal bleeding.  denies: vaginal d

ischarge, abdominal pain, nausea/vomiting, fever/chills, headaches, loss of 

appetite, dysuria, hematuria, rash, seizure, shortness of breath, syncope, 

weakness





- Related Data


Sexually active: Yes


                                  Previous Rx's











 Medication  Instructions  Recorded  Last Taken  Type


 


HYDROcodone/APAP 5-325 [[place] 1 each PO Q6HR PRN #10 tablet 06/04/14 Unknown Rx





5/325 mg]    


 


Ibuprofen [Motrin 800 MG tab] 800 mg PO Q8H #30 tablet 06/04/14 Unknown Rx


 


Sulfamethoxazole/Trimethoprim 1 each PO BID #14 tablet 06/04/14 Unknown Rx





[Bactrim Ds]    


 


HYDROcodone/APAP 5-325 [[place] 1 each PO Q6HR PRN #20 tablet 09/30/15 Unknown Rx





5-325 mg TAB]    


 


Ibuprofen [Motrin] 800 mg PO Q8HR PRN #20 tablet 02/13/21 Unknown Rx


 


metroNIDAZOLE [Flagyl] 500 mg PO Q12HR #14 tab 02/13/21 Unknown Rx


 


Chlorhexidine Mouthwash [Peridex] 15 ml MM BID #1 bottle 07/28/21 Unknown Rx


 


Naproxen [EC-Naprosyn] 500 mg PO BID PRN #20 tablet. 07/28/21 Unknown Rx


 


Penicillin Vk [Veetids TAB] 500 mg PO QID 7 Days #56 tablet 07/28/21 Unknown Rx


 


Ibuprofen [Motrin] 800 mg PO Q8HR PRN 15 Days #30 05/20/22 Unknown Rx





 tablet   


 


Penicillin V Potassium 500 mg PO BID 10 Days #20 tab 05/20/22 Unknown Rx


 


predniSONE [Deltasone] 50 mg PO QDAY 3 Days #3 tab 05/20/22 Unknown Rx











                                    Allergies











Allergy/AdvReac Type Severity Reaction Status Date / Time


 


No Known Allergies Allergy   Verified 07/28/21 10:13














ED Review of Systems


ROS: 


Stated complaint: STOMACH PAIN/ BACK PAIN


Other details as noted in HPI





Comment: All other systems reviewed and negative





ED Past Medical Hx





- Past Medical History


Previous Medical History?: Yes


Hx Hypertension: Yes


Additional medical history: gastroenteritis 2-2014, Ovarian Cyst, fibroids





- Surgical History


Past Surgical History?: No





- Family History


Family history: no significant





- Social History


Smoking Status: Current Every Day Smoker


Substance Use Type: Alcohol





- Medications


Home Medications: 


                                Home Medications











 Medication  Instructions  Recorded  Confirmed  Last Taken  Type


 


HYDROcodone/APAP 5-325 [[place] 1 each PO Q6HR PRN #10 tablet 06/04/14  Unknown Rx





5/325 mg]     


 


Ibuprofen [Motrin 800 MG tab] 800 mg PO Q8H #30 tablet 06/04/14  Unknown Rx


 


Sulfamethoxazole/Trimethoprim 1 each PO BID #14 tablet 06/04/14  Unknown Rx





[Bactrim Ds]     


 


HYDROcodone/APAP 5-325 [[place] 1 each PO Q6HR PRN #20 tablet 09/30/15  Unknown Rx





5-325 mg TAB]     


 


Ibuprofen [Motrin] 800 mg PO Q8HR PRN #20 tablet 02/13/21  Unknown Rx


 


metroNIDAZOLE [Flagyl] 500 mg PO Q12HR #14 tab 02/13/21  Unknown Rx


 


Chlorhexidine Mouthwash [Peridex] 15 ml MM BID #1 bottle 07/28/21  Unknown Rx


 


Naproxen [EC-Naprosyn] 500 mg PO BID PRN #20 tablet. 07/28/21  Unknown Rx


 


Penicillin Vk [Veetids TAB] 500 mg PO QID 7 Days #56 tablet 07/28/21  Unknown Rx


 


Ibuprofen [Motrin] 800 mg PO Q8HR PRN 15 Days #30 05/20/22  Unknown Rx





 tablet    


 


Penicillin V Potassium 500 mg PO BID 10 Days #20 tab 05/20/22  Unknown Rx


 


predniSONE [Deltasone] 50 mg PO QDAY 3 Days #3 tab 05/20/22  Unknown Rx














ED Physical Exam





- General


Limitations: No Limitations


General appearance: alert, in no apparent distress





- Head


Head exam: Present: atraumatic, normocephalic





- Eye


Eye exam: Present: normal appearance





- ENT


ENT exam: Present: mucous membranes moist





- Neck


Neck exam: Present: normal inspection





- Respiratory


Respiratory exam: Present: normal lung sounds bilaterally.  Absent: respiratory 

distress





- Cardiovascular


Cardiovascular Exam: Present: regular rate, normal rhythm.  Absent: systolic 

murmur, diastolic murmur, rubs, gallop





- GI/Abdominal


GI/Abdominal exam: Present: soft, normal bowel sounds.  Absent: distended, 

tenderness, guarding, rebound, rigid, diminished bowel sounds, hyperactive bowel

 sounds, hypoactive bowel sounds, organomegaly, mass, bruit, pulsatile mass, 

hernia





- Extremities Exam


Extremities exam: Present: normal inspection





- Back Exam


Back exam: Present: normal inspection





- Neurological Exam


Neurological exam: Present: alert, oriented X3





- Psychiatric


Psychiatric exam: Present: normal affect, normal mood





- Skin


Skin exam: Present: warm, dry, intact, normal color.  Absent: rash





ED Course


                                   Vital Signs











  09/29/22





  08:13


 


Temperature 99.1 F


 


Pulse Rate 77


 


Respiratory 14





Rate 


 


Blood Pressure 159/94





[Left] 


 


O2 Sat by Pulse 99





Oximetry 














ED Medical Decision Making





- Lab Data


Result diagrams: 


                                 09/29/22 09:01





                                 09/29/22 09:01





- Medical Decision Making








Labs











  09/29/22 09/29/22 09/29/22





  08:45 09:01 09:01


 


WBC   5.8 


 


RBC   5.36 H 


 


Hgb   11.7 


 


Hct   33.9 


 


MCV   63 L 


 


MCH   22 L 


 


MCHC   34 


 


RDW   16.8 H 


 


Plt Count   192 


 


Sodium    138


 


Potassium    4.8


 


Chloride    103.9


 


Carbon Dioxide    24


 


Anion Gap    15


 


BUN    12


 


Creatinine    0.7


 


Estimated GFR    > 60


 


BUN/Creatinine Ratio    17


 


Glucose    85


 


Calcium    8.9


 


HCG, Quant   


 


Urine Color  Yellow  


 


Urine Turbidity  Clear  


 


Urine pH  7.0  


 


Ur Specific Gravity  1.020  


 


Urine Protein  <15 mg/dl  


 


Urine Glucose (UA)  Neg  


 


Urine Ketones  Neg  


 


Urine Blood  Sm  


 


Urine Nitrite  Neg  


 


Urine Bilirubin  Neg  


 


Urine Urobilinogen  < 2  


 


Ur Leukocyte Esterase  Neg  


 


Urine WBC (Auto)  1.0  


 


Urine RBC (Auto)  1.0  


 


U Epithel Cells (Auto)  3.0  


 


Urine Mucus  Few  














  09/29/22





  09:01


 


WBC 


 


RBC 


 


Hgb 


 


Hct 


 


MCV 


 


MCH 


 


MCHC 


 


RDW 


 


Plt Count 


 


Sodium 


 


Potassium 


 


Chloride 


 


Carbon Dioxide 


 


Anion Gap 


 


BUN 


 


Creatinine 


 


Estimated GFR 


 


BUN/Creatinine Ratio 


 


Glucose 


 


Calcium 


 


HCG, Quant  < 1


 


Urine Color 


 


Urine Turbidity 


 


Urine pH 


 


Ur Specific Gravity 


 


Urine Protein 


 


Urine Glucose (UA) 


 


Urine Ketones 


 


Urine Blood 


 


Urine Nitrite 


 


Urine Bilirubin 


 


Urine Urobilinogen 


 


Ur Leukocyte Esterase 


 


Urine WBC (Auto) 


 


Urine RBC (Auto) 


 


U Epithel Cells (Auto) 


 


Urine Mucus 











                                   Vital Signs











  09/29/22





  08:13


 


Temperature 99.1 F


 


Pulse Rate 77


 


Respiratory 14





Rate 


 


Blood Pressure 159/94





[Left] 


 


O2 Sat by Pulse 99





Oximetry 








Labs noted.





Pregnancy negative





UA noted





Vital signs stable without signs of hypovolemia





Ambulatory, nontoxic non-ill-appearing.  Taking p.o.





Known fibroid history.





Patient being discharged home with discharge plan of care including diet, act

ivity, medications and follow-up.  Patient verbalizes understanding of plan of 

care.





- Differential Diagnosis


Rule out pregnancy, symptomatic anemia


Critical care attestation.: 


If time is entered above; I have spent that time in minutes in the direct care 

of this critically ill patient, excluding procedure time.








ED Disposition


Clinical Impression: 


 History of uterine fibroid, DUB (dysfunctional uterine bleeding)





Disposition: 01 HOME / SELF CARE / HOMELESS


Is pt being admited?: No


Does the pt Need Aspirin: No


Condition: Stable


Instructions:  Dysfunctional Uterine Bleeding


Additional Instructions: 


over the counter motrin or tylenol for pain 





follow up with obgyn


referral below


Referrals: 


DEREK AMARO MD [Primary Care Provider] - 3-5 Days


JENNY GRAYSON MD [Staff Physician] - 3-5 Days


Forms:  Work/School Release Form(ED)


Time of Disposition: 10:12